# Patient Record
Sex: MALE | Race: WHITE | URBAN - METROPOLITAN AREA
[De-identification: names, ages, dates, MRNs, and addresses within clinical notes are randomized per-mention and may not be internally consistent; named-entity substitution may affect disease eponyms.]

---

## 2018-05-23 ENCOUNTER — APPOINTMENT (OUTPATIENT)
Age: 83
Setting detail: DERMATOLOGY
End: 2018-05-24

## 2018-05-23 PROBLEM — C44.319 BASAL CELL CARCINOMA OF SKIN OF OTHER PARTS OF FACE: Status: ACTIVE | Noted: 2018-05-23

## 2018-05-23 PROCEDURE — OTHER COUNSELING: OTHER

## 2018-05-23 PROCEDURE — OTHER OTHER: OTHER

## 2018-05-23 PROCEDURE — OTHER MIPS QUALITY: OTHER

## 2018-05-23 PROCEDURE — 99202 OFFICE O/P NEW SF 15 MIN: CPT

## 2018-05-23 PROCEDURE — OTHER DEFER: OTHER

## 2018-05-23 NOTE — HPI: SKIN LESION (BASAL CELL CARCINOMA)
How Severe Is Your Skin Cancer?: moderate
Is This A New Presentation, Or A Follow-Up?: Basal Cell Carcinoma
Location From Outside Provider (Will Override Previously Chosen Location): [facility] [place]
When Was Basal Cell Biopsied? (Optional): 4/27/2018

## 2018-05-23 NOTE — PROCEDURE: DEFER
Scheduling Instructions (Optional): intramail sent to Claudia SWANSON to schedule surgery
Procedure To Be Performed At Next Visit: Mohs surgery
Instructions (Optional): Pt understands that Claudia will be calling him to schedule procedure
Detail Level: Simple

## 2018-05-23 NOTE — PROCEDURE: OTHER
Detail Level: Zone
Other (Free Text): **Type of BCC is not indicated on pathology report
Note Text (......Xxx Chief Complaint.): This diagnosis correlates with the

## 2018-05-30 ENCOUNTER — APPOINTMENT (OUTPATIENT)
Age: 83
Setting detail: DERMATOLOGY
End: 2018-06-06

## 2018-05-30 PROBLEM — C44.319 BASAL CELL CARCINOMA OF SKIN OF OTHER PARTS OF FACE: Status: ACTIVE | Noted: 2018-05-30

## 2018-05-30 PROCEDURE — 14041 TIS TRNFR F/C/C/M/N/A/G/H/F: CPT

## 2018-05-30 PROCEDURE — 17311 MOHS 1 STAGE H/N/HF/G: CPT

## 2018-05-30 PROCEDURE — OTHER CONSULTATION FOR MOHS SURGERY: OTHER

## 2018-05-30 PROCEDURE — 17312 MOHS ADDL STAGE: CPT

## 2018-05-30 PROCEDURE — OTHER MOHS SURGERY: OTHER

## 2021-07-01 ENCOUNTER — APPOINTMENT (OUTPATIENT)
Age: 86
Setting detail: DERMATOLOGY
End: 2021-07-12

## 2021-07-01 DIAGNOSIS — L57.0 ACTINIC KERATOSIS: ICD-10-CM

## 2021-07-01 DIAGNOSIS — L57.8 OTHER SKIN CHANGES DUE TO CHRONIC EXPOSURE TO NONIONIZING RADIATION: ICD-10-CM

## 2021-07-01 PROCEDURE — OTHER PRESCRIPTION MEDICATION MANAGEMENT: OTHER

## 2021-07-01 PROCEDURE — OTHER MIPS QUALITY: OTHER

## 2021-07-01 PROCEDURE — OTHER MEDICATION COUNSELING: OTHER

## 2021-07-01 PROCEDURE — 99204 OFFICE O/P NEW MOD 45 MIN: CPT

## 2021-07-01 PROCEDURE — OTHER COUNSELING: OTHER

## 2021-07-01 PROCEDURE — OTHER PRESCRIPTION: OTHER

## 2021-07-01 RX ORDER — FLUOROURACIL 5 MG/G
CREAM TOPICAL
Qty: 2 | Refills: 1 | Status: ERX | COMMUNITY
Start: 2021-07-01

## 2021-07-01 RX ORDER — TRIAMCINOLONE ACETONIDE 1 MG/G
CREAM TOPICAL
Qty: 1 | Refills: 0 | Status: ERX | COMMUNITY
Start: 2021-07-01

## 2021-07-01 ASSESSMENT — LOCATION DETAILED DESCRIPTION DERM
LOCATION DETAILED: LEFT SUPERIOR HELIX
LOCATION DETAILED: LEFT PROXIMAL DORSAL FOREARM
LOCATION DETAILED: MID POSTERIOR NECK
LOCATION DETAILED: LEFT INFERIOR FOREHEAD
LOCATION DETAILED: RIGHT DISTAL LATERAL POSTERIOR UPPER ARM
LOCATION DETAILED: LEFT PROXIMAL POSTERIOR UPPER ARM
LOCATION DETAILED: NASAL DORSUM
LOCATION DETAILED: LEFT CENTRAL MALAR CHEEK
LOCATION DETAILED: LEFT CHIN
LOCATION DETAILED: RIGHT FOREHEAD
LOCATION DETAILED: LEFT INFERIOR CENTRAL MALAR CHEEK
LOCATION DETAILED: RIGHT SUPERIOR HELIX
LOCATION DETAILED: RIGHT PROXIMAL RADIAL DORSAL FOREARM
LOCATION DETAILED: RIGHT INFERIOR CENTRAL MALAR CHEEK
LOCATION DETAILED: RIGHT PROXIMAL DORSAL FOREARM
LOCATION DETAILED: RIGHT CENTRAL MALAR CHEEK
LOCATION DETAILED: LEFT DISTAL DORSAL FOREARM

## 2021-07-01 ASSESSMENT — LOCATION SIMPLE DESCRIPTION DERM
LOCATION SIMPLE: RIGHT CHEEK
LOCATION SIMPLE: RIGHT FOREARM
LOCATION SIMPLE: LEFT FOREHEAD
LOCATION SIMPLE: LEFT EAR
LOCATION SIMPLE: LEFT UPPER ARM
LOCATION SIMPLE: RIGHT UPPER ARM
LOCATION SIMPLE: LEFT FOREARM
LOCATION SIMPLE: CHIN
LOCATION SIMPLE: POSTERIOR NECK
LOCATION SIMPLE: RIGHT EAR
LOCATION SIMPLE: LEFT CHEEK
LOCATION SIMPLE: NOSE
LOCATION SIMPLE: RIGHT FOREHEAD

## 2021-07-01 ASSESSMENT — LOCATION ZONE DERM
LOCATION ZONE: EAR
LOCATION ZONE: NOSE
LOCATION ZONE: ARM
LOCATION ZONE: NECK
LOCATION ZONE: FACE

## 2021-07-01 NOTE — HPI: SKIN LESION
What Type Of Note Output Would You Prefer (Optional)?: Bullet Format
How Severe Is Your Skin Lesion?: mild
Is This A New Presentation, Or A Follow-Up?: Skin Lesion
Additional History: Recently had BCC removed by Dr Zuleta and here to check the area

## 2021-07-01 NOTE — PROCEDURE: MEDICATION COUNSELING
Xellainez Pregnancy And Lactation Text: This medication is Pregnancy Category D and is not considered safe during pregnancy.  The risk during breast feeding is also uncertain.

## 2021-07-01 NOTE — PROCEDURE: MIPS QUALITY
Quality 226: Preventive Care And Screening: Tobacco Use: Screening And Cessation Intervention: Patient screened for tobacco use and is an ex/non-smoker
Quality 111:Pneumonia Vaccination Status For Older Adults: Pneumococcal Vaccination Previously Received
Detail Level: Detailed
Quality 110: Preventive Care And Screening: Influenza Immunization: Influenza Immunization previously received during influenza season
Quality 130: Documentation Of Current Medications In The Medical Record: Current Medications Documented
Quality 431: Preventive Care And Screening: Unhealthy Alcohol Use - Screening: Patient screened for unhealthy alcohol use using a single question and scores less than 2 times per year

## 2023-05-11 NOTE — PROCEDURE: MEDICATION COUNSELING
Detail Level: Zone Spironolactone Counseling: Patient advised regarding risks of diarrhea, abdominal pain, hyperkalemia, birth defects (for female patients), liver toxicity and renal toxicity. The patient may need blood work to monitor liver and kidney function and potassium levels while on therapy. The patient verbalized understanding of the proper use and possible adverse effects of spironolactone.  All of the patient's questions and concerns were addressed. Detail Level: Detailed

## 2024-01-15 NOTE — PROCEDURE: CONSULTATION FOR MOHS SURGERY
You recently visited our office for care. Thank you for entrusting our team with your care. Following your appointment, you may receive a survey. Weâd sincerely appreciate you taking the time to complete and return the survey, should you receive one. We value your input and closely monitor our survey results to insure we deliver the best care you, and all our patients, expect and deserve. Again, thank you for choosing our team for your care. If we need to contact you regarding any test results, we will make 2 attempts to reach you at the number you have listed during your office visit today. If we are unable to reach you, a letter with your results and any further instructions will be mailed to you home. Please do not hesitate to call our office with any questions or concerns at Dept: [phone].
Name Of The Referring Provider For Procedure: Zachary Low
Size Of Lesion: 1.2
Incorporate Mauc In Note: Yes
Detail Level: Detailed
X Size Of Lesion In Cm (Optional): 1.5

## 2024-03-23 NOTE — PROCEDURE: PRESCRIPTION MEDICATION MANAGEMENT
stated
Detail Level: Zone
Initiate Treatment: Efudex to forearms, tops oh hands, face, ears, and back of neck wide daily for 2 weeks\\nTRIAMCINOLONE to above areas bid for 1 week, starting 1 week after start of Efudex treatment
Render In Strict Bullet Format?: No
